# Patient Record
(demographics unavailable — no encounter records)

---

## 2024-12-13 NOTE — ASSESSMENT
[FreeTextEntry1] : 1- CT images reviewed with patient 2- vag exam benign  3- recommend to see GYN for further management of this  4- cont annual sono for cyst as before 5- check urine today

## 2024-12-13 NOTE — HISTORY OF PRESENT ILLNESS
[FreeTextEntry1] : Dec 2022: patient followed for complex cyst annually recent SZ that led to ER visit whre patint found to have UTI but no LUTS treated now here with cordell barron from PCP : jan , June and Dec 2022 that showed NO red cells , but bacteria and + ketones and eptih cells and white cells C/W vaginal contam' no CX done with any of urine above however patient states was given abx - and just finished CIpro 250 BID no fever , hematuria or dysuria admtis to avg water intae no pads or INC no bowel issues currenlty - recntly at time of SZ had constipaton still sexuyally acitve without pain or c/o dryness: 1- EMBER for complex cyst f/u 2- encourage increased fluids 3- discussed to NOT treate UTI if NO sxs 4- dsicussed that while Urinalysis may showed 1+ blood , MICRO eval negative for blood and no further eval needed 5- rtc annyally for sono if stable.  Here after CT scan --done afer recent sono showed abnl cyst in kidney  ( benign) but vaginal lesion seen on right labia - here for exam and review KIDNEYS/URETERS: There are multiple bilateral simple renal cysts. The largest on the right measures 3.7 x 2.9 cm and the largest on the left measures 5.1 x 5.0 cm. There are scattered bilateral cortical hypodensities which are too small to characterize. There is a 3 mm mid to lower pole right renal nonobstructing calcification. There is no hydronephrosis. BLADDER: Diffuse urinary bladder wall thickening. Correlate with urinalysis. REPRODUCTIVE ORGANS: Uterus and adnexa within normal limits. 1.0 x 1.4 x 0.9 cm high density possibly enhancing lesion at the right vaginal introitus/labia (6:95) (608:64). This is indeterminant. Recommend clinical correlation. no LUTS, no dysuria, no hemauira ,  no vag issues or bleeding , sexually active but no issues or pain felt  last GYN exam : this year _: neg by report -hx o HPV

## 2024-12-13 NOTE — PHYSICAL EXAM
[Normal Appearance] : normal appearance [Well Groomed] : well groomed [General Appearance - In No Acute Distress] : no acute distress [Edema] : no peripheral edema [Respiration, Rhythm And Depth] : normal respiratory rhythm and effort [Exaggerated Use Of Accessory Muscles For Inspiration] : no accessory muscle use [Abdomen Soft] : soft [Abdomen Tenderness] : non-tender [Costovertebral Angle Tenderness] : no ~M costovertebral angle tenderness [Urethral Meatus] : normal urethra [External Female Genitalia] : normal external genitalia [Vagina] : normal vaginal exam [Normal Station and Gait] : the gait and station were normal for the patient's age [] : no rash [No Focal Deficits] : no focal deficits [Oriented To Time, Place, And Person] : oriented to person, place, and time [Affect] : the affect was normal [Mood] : the mood was normal [Chaperone Present] : A chaperone was present in the examining room during all aspects of the physical examination [de-identified] : no vag d/c or lesions felt chapincito on right labia [FreeTextEntry2] : MANJIT De Santiago